# Patient Record
Sex: FEMALE | Race: WHITE | NOT HISPANIC OR LATINO | Employment: PART TIME | URBAN - METROPOLITAN AREA
[De-identification: names, ages, dates, MRNs, and addresses within clinical notes are randomized per-mention and may not be internally consistent; named-entity substitution may affect disease eponyms.]

---

## 2022-08-01 ENCOUNTER — OFFICE VISIT (OUTPATIENT)
Dept: VASCULAR SURGERY | Facility: CLINIC | Age: 57
End: 2022-08-01
Payer: COMMERCIAL

## 2022-08-01 DIAGNOSIS — I78.1 SPIDER VEINS: Primary | ICD-10-CM

## 2022-08-01 PROCEDURE — 99202 OFFICE O/P NEW SF 15 MIN: CPT | Performed by: SURGERY

## 2022-08-01 RX ORDER — ESCITALOPRAM OXALATE 5 MG/1
TABLET ORAL
COMMUNITY
Start: 2022-07-21

## 2022-08-01 NOTE — PATIENT INSTRUCTIONS
Sclerotherapy: Pre-Treatment Instructions    Recommended Sessions:  2-4 treatment sessions    Pricing: Full session - $407                 *Payment is due at the time of visit following the treatment    Time Required per Treatment Session - About 45 minutes  Please come in 15 minutes before your scheduled appointment.  30 min.  Sclerotherapy treatments last approximately 30 minutes.  5 min.    A staff member will wipe your legs off with warm water and dry them with a wash cloth.                 Then you can put your compression hose on, get dressed and check out.  10 min.  After your treatment, you will be asked to walk around for 10 minutes before you get in your car.    Medications  Five days before your appointment, discontinue aspirin (Bufferin, Anacin, etc.) and Ibuprofen (Motrin, Advil, Aleve, etc.) to reduce bruising. Resume these medications the day following the treatment.    Leg Preparation  Do not shave your legs or apply any oil, lotion or powder the night before or the day of your treatment.    Clothing  Shorts:  Bring a pair of loose, comfortable shorts to wear during your treatment (or you can choose to wear ours). Shoes: Bring comfortable shoes to accommodate the compression hose after your treatment. Do not wear flip-flops or thong-style sandals unless you have open-toe compression hose.    Photographs  Photos will be taken before each treatment. This helps monitor your progress.    Injections  The physician will inject your veins with the sclerotherapy solution chosen to meet your specific needs.    Compression Hose  Please bring your compression hose if you have them. They may also be reserved for you at our clinic. Compression hose must be worn immediately after each sclerotherapy treatment.  The hose must be compression level 20-30, and they must be worn for 24 hours straight after your treatment.  If you have never worn compression hose before, a staff member will teach you how to put them  on.             You cannot have a treatment without compression hose.               They are critical to the success of your treatment!    You may purchase your compression hose from us. We will measure you and have the hose available when you come for your treatment.    Cancellation and Rescheduling  If you need to cancel or reschedule your sclerotherapy treatment, please give our office at least 24 hour notice.    Sclerotherapy: Basic Information        What is sclerotherapy?  Sclerotherapy is a treatment for  spider  veins.  Spider  veins are small veins just under your skin that can look red, blue or purple. Most  spider  veins are only a cosmetic problem.  Spider  veins are not useful and treating them will not affect your circulation.    How does sclerotherapy work?  1.  Injections: A very small needle is used to inject a solution into the  spider  veins. The solution irritates the cells that line the vein walls. This causes the veins to collapse. The vein walls to stick together and they can no longer carry blood. Different solutions are used based on the size of the veins.  2.  Compression:  The spider veins are kept collapsed by wearing compression stockings. Your body will break down and absorb the treated veins. You wear the compression hose for 24 hours after the treatment and then for 4 more days during your waking hours only.    How does the body heal after sclerotherapy?  The process is similar to how your body heals after a bad bruise. It takes 4-6 weeks or more for the healing to be complete. When the healing is complete, the vein is no longer visible. It may take more than one treatment.    How do I get the best results?  It is important to follow the post-sclerotherapy instructions. The best results require time and patience. The injection sites will continue to heal and fade for months after a treatment. Please discuss your expectations with your doctor to keep them realistic. Your doctor will do  everything possible to meet or exceed your expectations.    How many treatments are needed?  After your initial exam, your doctor will give you an estimate of the number of treatments that may be required. It depends upon the size, type, and quantity of your  spider  veins and on the doctor's assessment, your history and expectations. You may end up needing fewer or more treatments.    How soon can I have another treatment?  Additional treatments are scheduled every 4-6 weeks to allow time for the body to respond to the previous treatment.    Common Side Effects:  Itching  The areas that were injected may itch. This is usually mild and lasts less than a day. Do not use lotions or creams on your legs until the injection sites have healed over.    Pain  It is common to have some tenderness at the injection sites. Injection of the solution can be uncomfortable, but is usually well tolerated by most patients. The tenderness is temporary, lasting 24 hours at most. Tylenol or Ibuprofen can be used, if needed, following the product directions.    Bruising  This may occur at the injections sites. Bruising may be minimized by avoiding Aspirin and Ibuprofen products for five days before each treatment session.    Hyperpigmentation  A light brown discoloration of the skin may develop along the veins in the areas injected. Approximately 20-30% of patients treated note the discoloration (which is lighter and less obvious than the veins that are being treated). The hyperpigmentation usually fades in a couple of weeks, but may take several months to a year to totally resolve. There is a 1% chance of hyperpigmentation continuing after one year.    Trapped blood  A small amount of blood may become trapped and hardened in the veins. This may feel like a knot or cord and it may look dark blue or bruised. This is a common occurrence. You may need to return before your next treatment so this area can be drained to remove the trapped  blood. This will reduce the hyperpigmentation that can occur. The chance of this occurring can be decreased with proper use of compression hose after your treatment.    Matting  Matting is the formation of new, fine  spider  veins in the area injected.  It occurs in approximately 10% of patients injected. The exact reason for this is unknown. If untreated, the matting usually resolves in 3 to 12 months, but very rarely, it can be permanent. If the matting does not fade, it can be re-injected.    Rare Side Effects:  Ulceration at injection sites  Very rarely, a small ulcer will occur at the site where a vein is injected. An ulcer can take 4 to 6 weeks to completely heal. A small scar may result.    Allergic reactions  There is a very rare incidence of an allergic reaction to the solution injected. You will be observed for such reaction and will be treated appropriately should it occur. Please inform us of any allergy history.    Pulmonary embolus/Deep vein thrombosis  This is a blood clot which moves to the lungs/a blood clot in the deep vein system. There is an extraordinarily low incidence of this complication.      SCLEROTHERAPY AFTER CARE  Immediately:  After treatment, walk for 10-15 minutes before getting in your car.  If your trip home is more than 1 hour, stop and walk around for 5-10 minutes. Avoid sitting or standing for extended periods.   First 24 hours: Wear your compression continuously, even while in bed. After the 24 hours, you may shower if you want to. Put your hose back on, unless you are going to bed. You should NOT wear compression to bed after the first 24 hours. You may fly the next day, but wear your compression.   For 5 days: Wear the compression hose for waking hours only. You may continue to wear them longer than 5 days if you prefer.   For days 5-7: Walking is encouraged, as it promotes efficient circulation in your veins. You may do activities that raise your heart rate, but do NOT run,  jog, do high impact aerobics, or weight lifting. After 7 days, no activity restrictions.  Shaving: Wait a few days to shave or apply lotion.   Bathing: Do NOT take hot baths or sit in a hot tub for 7-10 days.    For 1 year: Wear SPF 30 sunscreen on your legs when in the sun. This is very important! It helps prevent darkening of the skin at the injection sites.   Medications: You may resume your usual medications, including aspirin or ibuprofen.    Common Things to Expect       Compression must be worn for the first 24 hours and then during the day for 5-7 days.    If larger veins are treated with ultrasound-guided sclerotherapy, you will have redness, firmness, tenderness, and swelling.  This firmness and tenderness may take 3-6 months to resolve. Ibuprofen and compression hose will aid in this process.    You will have bruising that can last up to 3 weeks. Most fading of the veins will occur between 3 and 6 weeks after treatment.    You may notice brown discoloration (hyperpigmentation) at the treatment site.  This should fade with time, but will take 3 months to 1 year to fully heal.     Some treated veins may look darker because of trapped blood within the vein. This trapped blood can be removed at a minimum of 1 month following treatment. Larger veins are more likely to develop trapped blood.    It is very important for you to use at least SPF 30 sunscreen in order to help prevent the discoloration of your skin.    Migraines rarely occur following sclerotherapy, but are more likely in patients with a history of migraines.  Treat as you would any other migraine.      [employer] last reviewed this educational content on 11/1/2019 2000-2021 [employer]. All rights reserved. This information is not intended as a substitute for professional medical care. Always follow your healthcare professional's instructions.

## 2022-08-01 NOTE — NURSING NOTE
Patient Reported symptoms:    Right leg   Heaviness None of the time   Achiness None of the time   Swelling None of the time   Throbbing None of the time   Itching None of the time   Appearance Slightly noticeable   Impact on work/activities Symptoms but full able to participate    Left Leg   Heaviness None of the time   Achiness None of the time   Swelling None of the time   Throbbing None of the time   Itching None of the time   Appearance Slightly noticeable   Impact on work/activities Symptoms but full able to participate

## 2022-08-01 NOTE — PROGRESS NOTES
[facility] CONSULTATION    HPI:    Anyi Farris is a pleasant 57 year old female who presents for evaluation of bilateral lower extremity spider telangiectasias and reticular veins primarily of cosmetic concern but who also has restless leg symptoms.  She denies significant pain, deep vein thrombosis, superficial thrombophlebitis or hemorrhage.  She does not have swelling of her legs.  She has not worn compression hose to any significant extent.    She is unaware of a family history of varicose veins.    PAST MEDICAL HISTORY: Chronic left knee pain, past iron deficiency    PAST SURGICAL HISTORY: Not documented    FAMILY HISTORY: Significant for coronary artery disease in her mother    SOCIAL HISTORY:   Never smoker.  Rarely uses alcohol, maybe once or twice per month, 1-2 drinks.    REVIEW OF SYSTEMS: Review Of Systems  Skin: negative  Eyes: negative  Ears/Nose/Throat: negative  Respiratory: No shortness of breath, dyspnea on exertion, cough, or hemoptysis  Cardiovascular: Rare, short-lived chest pain, not associated with exercise  Gastrointestinal: Constipation  Genitourinary: negative  Musculoskeletal: Back pain, neck pain  Neurologic: negative  Psychiatric: Depression, anxiety  Hematologic/Lymphatic/Immunologic: negative  Endocrine: Hot flashes, postmenopausal      Vital signs:  There were no vitals taken for this visit.    Current Outpatient Medications   Medication Sig Dispense Refill     escitalopram (LEXAPRO) 5 MG tablet Take 1 & 1/2 tablets (7.5 mg)by mouth once daily with food         PHYSICAL EXAM:  General: Pleasant, NAD.   HEENT: Normocephalic, atraumatic, external ears and nose normal.   Respiratory: Normal respiratory effort.   Cardiovascular: Pulse is regular.  Regular rate and rhythm without murmur  Musculoskeletal: Gait and station normal.  The joints of her fingers and toes without deformity.  There is no cyanosis of her nailbeds.   EXTREMITIES: Right lower extremity: A few scattered  telangiectasias over the posterior lateral thigh and across the popliteal fossa.  No varicose veins, edema or stasis changes.    Left lower extremity: Cluster of telangiectasias over the lateral and posterior lateral mid thigh with a few scattered down across the popliteal fossa.  No edema or stasis changes.  PULSES: R/L (3=normal pulse, 0=no palpable pulse) dorsalis pedis: 3/3; posterior tibial: 3/3.      Neurologic: Grossly normal  Psychiatric: Mood, affect, judgment and insight are normal     ASSESSMENT:  Bilateral lower extremity spider telangiectasias, left greater than right, of cosmetic concern.  We discussed the etiology of telangiectasias and the fact that they are of little to no medical concern.  Rarely one can bleed from them.    We discussed cosmetic sclerotherapy for treating these telangiectasias utilizing polidocanol foam.  Details of procedure including risks of allergic reaction, superficial phlebitis, deep vein thrombosis, ulceration, hyperpigmentation and matting were discussed.  She understands that multiple sessions may be necessary and that this will not be covered by insurance and will be her responsibility.  She voiced understanding.    She had questions regarding her left knee pain and restless leg symptoms.  Neither of these should be affected by the sclerotherapy.    PLAN:  Possible bilateral lower extremity cosmetic sclerotherapy.  Estimates were given.     Nick Teran MD    Dictated using Dragon voice recognition software which may result in transcription errors        VEIN CLINIC LEG DRAWING:

## 2022-08-01 NOTE — LETTER
8/1/2022         RE: Anyi Farris  [address]  [place] [facility] [address]        Dear Colleague,    Thank you for referring your patient, Anyi Farris, to the [facility] [place]. Please see a copy of my visit note below.        After Visit Follow Up  Per Dr. GRANADO, patient was recommended to have 2 - 4 sessions at $400 of cosmetic sclerotherapy.    Reviewed with and gave to patient our vein clinic sclerotherapy packet of information which includes basic sclerotherapy information, pre-treatment instructions and post-treatment instructions.    Patient in agreement with plan and had no further questions.    Adelina Flores MA on 8/1/2022 at 9:30 AM      [facility] CONSULTATION    HPI:    Anyi Farris is a pleasant 57 year old female who presents for evaluation of bilateral lower extremity spider telangiectasias and reticular veins primarily of cosmetic concern but who also has restless leg symptoms.  She denies significant pain, deep vein thrombosis, superficial thrombophlebitis or hemorrhage.  She does not have swelling of her legs.  She has not worn compression hose to any significant extent.    She is unaware of a family history of varicose veins.    PAST MEDICAL HISTORY: Chronic left knee pain, past iron deficiency    PAST SURGICAL HISTORY: Not documented    FAMILY HISTORY: Significant for coronary artery disease in her mother    SOCIAL HISTORY:   Never smoker.  Rarely uses alcohol, maybe once or twice per month, 1-2 drinks.    REVIEW OF SYSTEMS: Review Of Systems  Skin: negative  Eyes: negative  Ears/Nose/Throat: negative  Respiratory: No shortness of breath, dyspnea on exertion, cough, or hemoptysis  Cardiovascular: Rare, short-lived chest pain, not associated with exercise  Gastrointestinal: Constipation  Genitourinary: negative  Musculoskeletal: Back pain, neck pain  Neurologic: negative  Psychiatric: Depression, anxiety  Hematologic/Lymphatic/Immunologic:  negative  Endocrine: Hot flashes, postmenopausal      Vital signs:  There were no vitals taken for this visit.    Current Outpatient Medications   Medication Sig Dispense Refill     escitalopram (LEXAPRO) 5 MG tablet Take 1 & 1/2 tablets (7.5 mg)by mouth once daily with food         PHYSICAL EXAM:  General: Pleasant, NAD.   HEENT: Normocephalic, atraumatic, external ears and nose normal.   Respiratory: Normal respiratory effort.   Cardiovascular: Pulse is regular.  Regular rate and rhythm without murmur  Musculoskeletal: Gait and station normal.  The joints of her fingers and toes without deformity.  There is no cyanosis of her nailbeds.   EXTREMITIES: Right lower extremity: A few scattered telangiectasias over the posterior lateral thigh and across the popliteal fossa.  No varicose veins, edema or stasis changes.    Left lower extremity: Cluster of telangiectasias over the lateral and posterior lateral mid thigh with a few scattered down across the popliteal fossa.  No edema or stasis changes.  PULSES: R/L (3=normal pulse, 0=no palpable pulse) dorsalis pedis: 3/3; posterior tibial: 3/3.      Neurologic: Grossly normal  Psychiatric: Mood, affect, judgment and insight are normal     ASSESSMENT:  Bilateral lower extremity spider telangiectasias, left greater than right, of cosmetic concern.  We discussed the etiology of telangiectasias and the fact that they are of little to no medical concern.  Rarely one can bleed from them.    We discussed cosmetic sclerotherapy for treating these telangiectasias utilizing polidocanol foam.  Details of procedure including risks of allergic reaction, superficial phlebitis, deep vein thrombosis, ulceration, hyperpigmentation and matting were discussed.  She understands that multiple sessions may be necessary and that this will not be covered by insurance and will be her responsibility.  She voiced understanding.    She had questions regarding her left knee pain and restless leg  symptoms.  Neither of these should be affected by the sclerotherapy.    PLAN:  Possible bilateral lower extremity cosmetic sclerotherapy.  Estimates were given.     Nick Teran MD    Dictated using Dragon voice recognition software which may result in transcription errors        VEIN CLINIC LEG DRAWING:                  Again, thank you for allowing me to participate in the care of your patient.        Sincerely,        Nick Teran MD

## 2022-08-01 NOTE — PROGRESS NOTES
After Visit Follow Up  Per Dr. GRANADO, patient was recommended to have 2 - 4 sessions at $400 of cosmetic sclerotherapy.    Reviewed with and gave to patient our vein clinic sclerotherapy packet of information which includes basic sclerotherapy information, pre-treatment instructions and post-treatment instructions.    Patient in agreement with plan and had no further questions.    Adelina Flores MA on 8/1/2022 at 9:30 AM

## 2022-11-22 ENCOUNTER — OFFICE VISIT (OUTPATIENT)
Dept: VASCULAR SURGERY | Facility: CLINIC | Age: 57
End: 2022-11-22
Payer: COMMERCIAL

## 2022-11-22 DIAGNOSIS — I78.1 SPIDER VEINS: Primary | ICD-10-CM

## 2022-11-22 DIAGNOSIS — I78.1 SPIDER TELANGIECTASIS OF SKIN: ICD-10-CM

## 2022-11-22 PROCEDURE — 36468 NJX SCLRSNT SPIDER VEINS: CPT | Performed by: SURGERY

## 2022-11-22 PROCEDURE — S9999 SALES TAX: HCPCS | Performed by: SURGERY

## 2022-11-22 NOTE — LETTER
11/22/2022         RE: Anyi Farris  [address] [facility] [address]        Dear Colleague,    Thank you for referring your patient, Anyi Farris, to the [facility] [place]. Please see a copy of my visit note below.        Vein Clinic Sclerotherapy Note     Indications:  Bilateral lower extremity spider telangiectasias of cosmetic concern     Procedure:  Bilateral lower extremity cosmetic sclerotherapy     Procedure description  Details of procedure including risks of allergic reaction, deep vein thrombosis, ulceration, superficial thrombophlebitis and hyperpigmentation were discussed.  The patient voiced understanding and wished to proceed.  Informed consent was obtained.    I donned the Syris headlight and injected 0.5% polidocanol foam into telangiectasias from the ankle to the upper thighs circumferentially using 4 syringes (12 mL) of 1.5% polidocanol foam.  There are also some reticular veins of the right inguinal area patient wished for me to inject.  I had the patient perform ankle pulse.    We cleaned her leg, had her don compression, then had a walk for 10 minutes.  She will return in approximate 6 weeks in follow-up.    Sclerotherapy    Date/Time: 11/22/2022 2:32 PM  Performed by: Nick Teran MD  Authorized by: Nick Teran MD     Time out: Immediately prior to the procedure a time out was called    Type:  Cosmetic  Session:  Full  Procedure side:  Bilateral  Solution/Amount:  .5 POLIDOCANOL  Syringes:  4 syringes (12 around 0.5% polidocanol foam)  Patient tolerance:  Patient tolerated the procedure well with no immediate complications  Wrap/Hose:  Luis Enrique Teran MD    Dictated using Dragon voice recognition software which may result in transcription errors      Again, thank you for allowing me to participate in the care of your patient.        Sincerely,        Nick Teran MD

## 2022-11-22 NOTE — PROGRESS NOTES
Vein Clinic Sclerotherapy Note     Indications:  Bilateral lower extremity spider telangiectasias of cosmetic concern     Procedure:  Bilateral lower extremity cosmetic sclerotherapy     Procedure description  Details of procedure including risks of allergic reaction, deep vein thrombosis, ulceration, superficial thrombophlebitis and hyperpigmentation were discussed.  The patient voiced understanding and wished to proceed.  Informed consent was obtained.    I donned the Syris headlight and injected 0.5% polidocanol foam into telangiectasias from the ankle to the upper thighs circumferentially using 4 syringes (12 mL) of 1.5% polidocanol foam.  There are also some reticular veins of the right inguinal area patient wished for me to inject.  I had the patient perform ankle pulse.    We cleaned her leg, had her don compression, then had a walk for 10 minutes.  She will return in approximate 6 weeks in follow-up.    Sclerotherapy    Date/Time: 11/22/2022 2:32 PM  Performed by: Nick Teran MD  Authorized by: Nick Teran MD     Time out: Immediately prior to the procedure a time out was called    Type:  Cosmetic  Session:  Full  Procedure side:  Bilateral  Solution/Amount:  .5 POLIDOCANOL  Syringes:  4 syringes (12 around 0.5% polidocanol foam)  Patient tolerance:  Patient tolerated the procedure well with no immediate complications  Wrap/Hose:  Luis Enrique Teran MD    Dictated using Dragon voice recognition software which may result in transcription errors

## 2022-12-10 ENCOUNTER — HEALTH MAINTENANCE LETTER (OUTPATIENT)
Age: 57
End: 2022-12-10

## 2023-01-02 ENCOUNTER — OFFICE VISIT (OUTPATIENT)
Dept: VASCULAR SURGERY | Facility: CLINIC | Age: 58
End: 2023-01-02
Payer: COMMERCIAL

## 2023-01-02 DIAGNOSIS — I78.1 SPIDER TELANGIECTASIS OF SKIN: Primary | ICD-10-CM

## 2023-01-02 PROCEDURE — S9999 SALES TAX: HCPCS | Performed by: SURGERY

## 2023-01-02 PROCEDURE — 36468 NJX SCLRSNT SPIDER VEINS: CPT | Performed by: SURGERY

## 2023-01-02 NOTE — LETTER
1/2/2023         RE: Anyi Bianchi  [id number]  [facility] [address]        Dear Colleague,    Thank you for referring your patient, Anyi Bianchi, to the [facility] VEIN CLINIC [place]. Please see a copy of my visit note below.        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  The patient voiced understanding and wished to proceed.  Informed consent was obtained.    The patient felt that she had had some improvement but still wished to have additional sclerotherapy.  The veins in the right inguinal area, single area on the anterior aspect of her right thigh and the patch of telangiectasias on the lateral left thigh with the areas of most concern to her.    I donned the Syris headlight and injected the veins on the anterior and lateral left thigh.  There had been improvement though there were multiple residual veins.  There were a few areas of trapped blood as well.    Injected a few telangiectasias on the lateral left leg and then injected a few on the right medial leg and thigh.    I then approached the right anterior and lateral thigh.  I also reinjected the area in the right inguinal crease.  These veins were quite mobile and were challenging to access.    I had the patient turned prone and injected residual telangiectasias on the posterior aspect of her left thigh and in both popliteal fossas.  I had the patient perform ankle pumps.    There was trapped blood in a previously injected vein in the left popliteal fossa which was cleaned with alcohol and  stab wound made with an 18-gauge needle and thrombus expressed.  There were a few other scattered areas of trapped blood which were also released in the same fashion.    We cleaned her legs, had her don compression, then had a walk for 10 minutes.  She will return in approximate 6 weeks in follow-up.  No treatment will be planned at that time.  I am attempting 1 to check for trapped blood.    Procedures    DIANA Teran MD    Dictated using Dragon voice recognition software which may result in transcription errors      Again, thank you for allowing me to participate in the care of your patient.        Sincerely,        Nick Teran MD

## 2023-01-02 NOTE — PROGRESS NOTES
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  The patient voiced understanding and wished to proceed.  Informed consent was obtained.    The patient felt that she had had some improvement but still wished to have additional sclerotherapy.  The veins in the right inguinal area, single area on the anterior aspect of her right thigh and the patch of telangiectasias on the lateral left thigh with the areas of most concern to her.    I donned the Syris headlight and injected the veins on the anterior and lateral left thigh.  There had been improvement though there were multiple residual veins.  There were a few areas of trapped blood as well.    Injected a few telangiectasias on the lateral left leg and then injected a few on the right medial leg and thigh.    I then approached the right anterior and lateral thigh.  I also reinjected the area in the right inguinal crease.  These veins were quite mobile and were challenging to access.    I had the patient turned prone and injected residual telangiectasias on the posterior aspect of her left thigh and in both popliteal fossas.  I had the patient perform ankle pumps.    There was trapped blood in a previously injected vein in the left popliteal fossa which was cleaned with alcohol and stab wound made with an 18-gauge needle and thrombus expressed.  There were a few other scattered areas of trapped blood which were also released in the same fashion.    We cleaned her legs, had her don compression, then had a walk for 10 minutes.  She will return in  approximate 6 weeks in follow-up.  No treatment will be planned at that time.  I am attempting 1 to check for trapped blood.    Procedures    C Sarai Teran MD    Dictated using Dragon voice recognition software which may result in transcription errors

## 2023-03-14 ENCOUNTER — OFFICE VISIT (OUTPATIENT)
Dept: VASCULAR SURGERY | Facility: CLINIC | Age: 58
End: 2023-03-14
Payer: COMMERCIAL

## 2023-03-14 DIAGNOSIS — I78.1 SPIDER TELANGIECTASIS OF SKIN: Primary | ICD-10-CM

## 2023-03-14 PROCEDURE — S9999 SALES TAX: HCPCS | Performed by: SURGERY

## 2023-03-14 NOTE — LETTER
3/14/2023         RE: Anyi Bianchi  [address] [facility] [address]        Dear Colleague,    Thank you for referring your patient, Anyi Bianchi, to the [facility] VEIN CLINIC [place]. Please see a copy of my visit note below.        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  The patient voiced understanding and wished to proceed.  Informed consent was obtained.    There were a few scattered areas of telangiectasias over her lateral left thigh in a bandlike distribution as well as on the anterior left leg and a few telangiectasias and reticular veins in the popliteal fossas, right greater than left.  Also of concern was a small varicosity in the right groin crease.    I donned the Syris headlight and injected all of the a forementioned areas using 0.5% polidocanol foam.  Used a total of 6 mL of foam.  I had the patient perform ankle pumps.    Cleaned her legs, had compression and then had her walk for 10 minutes.  She will likely return in 6 weeks in follow-up but I do not believe we will need treatment at that time, simply we will check for trapped blood.    Sclerotherapy    Date/Time: 3/15/2023 8:01 AM  Performed by: Nick Teran MD  Authorized by: Nick Teran MD     Time out: Immediately prior to the procedure a time out was called    Type:  Cosmetic  Session:  Limited  Procedure side:  Bilateral  Solution/Amount:  .5 POLIDOCANOL  Syringes:  2  Patient tolerance:  Patient tolerated the procedure well with no immediate  complications  Wrap/Hose:  Hose        C Sarai Teran MD    Dictated using Dragon voice recognition software which may result in transcription errors      Again, thank you for allowing me to participate in the care of your patient.        Sincerely,        Nick Teran MD

## 2023-03-15 PROCEDURE — 36468 NJX SCLRSNT SPIDER VEINS: CPT | Performed by: SURGERY

## 2023-03-15 NOTE — PROGRESS NOTES
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  The patient voiced understanding and wished to proceed.  Informed consent was obtained.    There were a few scattered areas of telangiectasias over her lateral left thigh in a bandlike distribution as well as on the anterior left leg and a few telangiectasias and reticular veins in the popliteal fossas, right greater than left.  Also of concern was a small varicosity in the right groin crease.    I donned the Syris headlight and injected all of the a forementioned areas using 0.5% polidocanol foam.  Used a total of 6 mL of foam.  I had the patient perform ankle pumps.    Cleaned her legs, had compression and then had her walk for 10 minutes.  She will likely return in 6 weeks in follow-up but I do not believe we will need treatment at that time, simply we will check for trapped blood.    Sclerotherapy    Date/Time: 3/15/2023 8:01 AM  Performed by: Nick Teran MD  Authorized by: Nick Teran MD     Time out: Immediately prior to the procedure a time out was called    Type:  Cosmetic  Session:  Limited  Procedure side:  Bilateral  Solution/Amount:  .5 POLIDOCANOL  Syringes:  2  Patient tolerance:  Patient tolerated the procedure well with no immediate complications  Wrap/Hose:  Luis Enrique Teran MD    Dictated using Dragon voice recognition software which may result in transcription errors

## 2023-05-09 ENCOUNTER — OFFICE VISIT (OUTPATIENT)
Dept: VASCULAR SURGERY | Facility: CLINIC | Age: 58
End: 2023-05-09
Payer: COMMERCIAL

## 2023-05-09 DIAGNOSIS — I78.1 SPIDER TELANGIECTASIS OF SKIN: Primary | ICD-10-CM

## 2023-05-09 PROCEDURE — 99207 PR VEINSOLUTIONS NO CHARGE VISIT: CPT | Performed by: SURGERY

## 2023-05-09 NOTE — PROGRESS NOTES
[facility] vein clinic [place] office note  Anyi Bianchi returns following three sessions of cosmetic sclerotherapy.  Overall she is pleased with her progress.    Exam  There are a few, scattered telangiectasias over the left greater than right lateral thighs though there is been tremendous improvement when reviewing her pretreatment photos.  Also, the reticular veins of concern in the right proximal anterior right thigh and groin area are nonvisualized.    Assessment/plan  Good result following 3 sessions of cosmetic sclerotherapy.  There is been at least an 80% reduction in the appearance of the veins.  She pointed out a few areas that remain over the medial ankles and a single area on the lateral left ankle as well as a few scattered over the lateral thighs.    She will continue to get some improvement from previous treatments.  This some minor areas of hyperpigmentation should continue to fade as well.    Return on an as-needed basis.    DIANA Teran MD, FACS    Dictated using Dragon voice recognition software which may result in transcription errors

## 2023-05-09 NOTE — LETTER
5/9/2023         RE: Anyi Bianchi  [address]  [facility] [id number]        Dear Colleague,    Thank you for referring your patient, Anyi Bianchi, to the [facility] VEIN CLINIC [place]. Please see a copy of my visit note below.    [facility] vein [facility] office note  Anyi Bianchi returns following three sessions of cosmetic sclerotherapy.  Overall she is pleased with her progress.    Exam  There are a few, scattered telangiectasias over the left greater than right lateral thighs though there is been tremendous improvement when reviewing her pretreatment photos.  Also, the reticular veins of concern in the right proximal anterior right thigh and groin area are nonvisualized.    Assessment/plan  Good result following 3 sessions of cosmetic sclerotherapy.  There is been at least an 80% reduction in the appearance of the veins.  She pointed out a few areas that remain over the medial ankles and a single area on the lateral left ankle as well as a few scattered over the lateral thighs.    She will continue to get some improvement from previous treatments.  This some minor areas of hyperpigmentation should continue to fade as well.    Return on an as-needed basis.    DIANA Teran MD, FACS    Dictated using Dragon voice recognition software which may result in transcription errors        Again, thank you for allowing me to participate in the care of your patient.        Sincerely,        Nick Teran MD

## 2023-08-27 ENCOUNTER — HEALTH MAINTENANCE LETTER (OUTPATIENT)
Age: 58
End: 2023-08-27

## 2024-10-20 ENCOUNTER — HEALTH MAINTENANCE LETTER (OUTPATIENT)
Age: 59
End: 2024-10-20

## 2024-12-22 ENCOUNTER — HEALTH MAINTENANCE LETTER (OUTPATIENT)
Age: 59
End: 2024-12-22